# Patient Record
Sex: MALE | Race: WHITE | NOT HISPANIC OR LATINO | Employment: OTHER | URBAN - METROPOLITAN AREA
[De-identification: names, ages, dates, MRNs, and addresses within clinical notes are randomized per-mention and may not be internally consistent; named-entity substitution may affect disease eponyms.]

---

## 2022-01-26 ENCOUNTER — TELEPHONE (OUTPATIENT)
Dept: NEUROLOGY | Facility: CLINIC | Age: 77
End: 2022-01-26
Payer: MEDICARE

## 2022-02-16 ENCOUNTER — TELEPHONE (OUTPATIENT)
Dept: NEUROLOGY | Facility: CLINIC | Age: 77
End: 2022-02-16
Payer: MEDICARE

## 2022-02-16 NOTE — TELEPHONE ENCOUNTER
New patient would like to know if you know of any other doctor that does Iovera. The insurance options they have [facility] doesn't accept. They know of the physician in [place]. Please advise.

## 2022-02-18 ENCOUNTER — PATIENT MESSAGE (OUTPATIENT)
Dept: NEUROLOGY | Facility: CLINIC | Age: 77
End: 2022-02-18
Payer: MEDICARE

## 2022-07-15 ENCOUNTER — TELEPHONE (OUTPATIENT)
Dept: NEUROLOGY | Facility: CLINIC | Age: 77
End: 2022-07-15
Payer: COMMERCIAL

## 2022-07-15 NOTE — TELEPHONE ENCOUNTER
----- Message from Melonie Kenney sent at 7/15/2022  4:10 PM CDT -----  Regarding: Appt Access  Contact: [phone]  Request Appt Access    Who Called: Melania BEYER(Wife)  Appt Type: Consult for Nerve Block  Call Back Number:[phone]  Additional Information: The patient wife called to schedule the first available appointment.

## 2022-07-15 NOTE — TELEPHONE ENCOUNTER
Spoke with spouse of the patient and the following was discussed:    Patient previously had an appointment back in March but had to cancel due to insurance. Patient recently updated insurance to Medicare/[facility]. Patient wants to be schedule to have iovera performed but because patient has not been established yet, need to be scheduled as a new patient prior to any procedure. Informed spouse that the next available date won't be until October.    As for insurance, I will confirm with team to make sure insurance is acceptable prior to making an appointment.

## 2022-07-19 NOTE — TELEPHONE ENCOUNTER
Spoke with spouse of the patient, verified that insurance should be good and can be scheduled. Offered 7daychange slot on October 3 at 3pm.

## 2022-08-04 ENCOUNTER — PATIENT MESSAGE (OUTPATIENT)
Dept: NEUROLOGY | Facility: CLINIC | Age: 77
End: 2022-08-04
Payer: COMMERCIAL

## 2022-09-26 ENCOUNTER — TELEPHONE (OUTPATIENT)
Dept: NEUROLOGY | Facility: CLINIC | Age: 77
End: 2022-09-26
Payer: COMMERCIAL

## 2022-09-26 NOTE — TELEPHONE ENCOUNTER
----- Message from [facility] sent at 9/26/2022  4:10 PM CDT -----  Type:  Needs Medical Advice    Who Called: Pt   Would the patient rather a call back or a response via [facility]? Callback   Best Call Back Number: [phone]  Additional Information: pt requesting callback from office to discuss appt that is missing from his schedule and epic

## 2022-09-27 NOTE — TELEPHONE ENCOUNTER
Spoke with spouse of the patient and informed that the appointment is made and scheduled on 10/03 at 3pm

## 2022-10-03 ENCOUNTER — HOSPITAL ENCOUNTER (OUTPATIENT)
Dept: RADIOLOGY | Facility: HOSPITAL | Age: 77
Discharge: HOME | End: 2022-10-03
Attending: PSYCHIATRY & NEUROLOGY
Payer: COMMERCIAL

## 2022-10-03 ENCOUNTER — OFFICE VISIT (OUTPATIENT)
Dept: NEUROLOGY | Facility: CLINIC | Age: 77
End: 2022-10-03
Payer: COMMERCIAL

## 2022-10-03 VITALS
HEIGHT: 67 IN | DIASTOLIC BLOOD PRESSURE: 71 MMHG | WEIGHT: 152.13 LBS | SYSTOLIC BLOOD PRESSURE: 121 MMHG | BODY MASS INDEX: 23.88 KG/M2 | HEART RATE: 83 BPM

## 2022-10-03 DIAGNOSIS — M50.30 DDD (DEGENERATIVE DISC DISEASE), CERVICAL: ICD-10-CM

## 2022-10-03 DIAGNOSIS — M54.81 OCCIPITAL NEURALGIA OF RIGHT SIDE: ICD-10-CM

## 2022-10-03 DIAGNOSIS — M54.81 OCCIPITAL NEURALGIA OF RIGHT SIDE: Primary | ICD-10-CM

## 2022-10-03 PROCEDURE — 1159F PR MEDICATION LIST DOCUMENTED IN MEDICAL RECORD: ICD-10-PCS | Mod: CPTII,S$GLB,, | Performed by: PSYCHIATRY & NEUROLOGY

## 2022-10-03 PROCEDURE — 72050 X-RAY EXAM NECK SPINE 4/5VWS: CPT | Mod: TC

## 2022-10-03 PROCEDURE — 1160F RVW MEDS BY RX/DR IN RCRD: CPT | Mod: CPTII,S$GLB,, | Performed by: PSYCHIATRY & NEUROLOGY

## 2022-10-03 PROCEDURE — 1101F PR PT FALLS ASSESS DOC 0-1 FALLS W/OUT INJ PAST YR: ICD-10-PCS | Mod: CPTII,S$GLB,, | Performed by: PSYCHIATRY & NEUROLOGY

## 2022-10-03 PROCEDURE — 3288F FALL RISK ASSESSMENT DOCD: CPT | Mod: CPTII,S$GLB,, | Performed by: PSYCHIATRY & NEUROLOGY

## 2022-10-03 PROCEDURE — 72050 XR CERVICAL SPINE COMPLETE 5 VIEW: ICD-10-PCS | Mod: 26,,, | Performed by: RADIOLOGY

## 2022-10-03 PROCEDURE — 3288F PR FALLS RISK ASSESSMENT DOCUMENTED: ICD-10-PCS | Mod: CPTII,S$GLB,, | Performed by: PSYCHIATRY & NEUROLOGY

## 2022-10-03 PROCEDURE — 99999 PR PBB SHADOW E&M-EST. PATIENT-LVL III: ICD-10-PCS | Mod: PBBFAC,,, | Performed by: PSYCHIATRY & NEUROLOGY

## 2022-10-03 PROCEDURE — 1159F MED LIST DOCD IN RCRD: CPT | Mod: CPTII,S$GLB,, | Performed by: PSYCHIATRY & NEUROLOGY

## 2022-10-03 PROCEDURE — 1101F PT FALLS ASSESS-DOCD LE1/YR: CPT | Mod: CPTII,S$GLB,, | Performed by: PSYCHIATRY & NEUROLOGY

## 2022-10-03 PROCEDURE — 3078F DIAST BP <80 MM HG: CPT | Mod: CPTII,S$GLB,, | Performed by: PSYCHIATRY & NEUROLOGY

## 2022-10-03 PROCEDURE — 99203 PR OFFICE/OUTPT VISIT, NEW, LEVL III, 30-44 MIN: ICD-10-PCS | Mod: S$GLB,,, | Performed by: PSYCHIATRY & NEUROLOGY

## 2022-10-03 PROCEDURE — 3074F PR MOST RECENT SYSTOLIC BLOOD PRESSURE < 130 MM HG: ICD-10-PCS | Mod: CPTII,S$GLB,, | Performed by: PSYCHIATRY & NEUROLOGY

## 2022-10-03 PROCEDURE — 99999 PR PBB SHADOW E&M-EST. PATIENT-LVL III: CPT | Mod: PBBFAC,,, | Performed by: PSYCHIATRY & NEUROLOGY

## 2022-10-03 PROCEDURE — 1160F PR REVIEW ALL MEDS BY PRESCRIBER/CLIN PHARMACIST DOCUMENTED: ICD-10-PCS | Mod: CPTII,S$GLB,, | Performed by: PSYCHIATRY & NEUROLOGY

## 2022-10-03 PROCEDURE — 3074F SYST BP LT 130 MM HG: CPT | Mod: CPTII,S$GLB,, | Performed by: PSYCHIATRY & NEUROLOGY

## 2022-10-03 PROCEDURE — 99203 OFFICE O/P NEW LOW 30 MIN: CPT | Mod: S$GLB,,, | Performed by: PSYCHIATRY & NEUROLOGY

## 2022-10-03 PROCEDURE — 1125F PR PAIN SEVERITY QUANTIFIED, PAIN PRESENT: ICD-10-PCS | Mod: CPTII,S$GLB,, | Performed by: PSYCHIATRY & NEUROLOGY

## 2022-10-03 PROCEDURE — 3078F PR MOST RECENT DIASTOLIC BLOOD PRESSURE < 80 MM HG: ICD-10-PCS | Mod: CPTII,S$GLB,, | Performed by: PSYCHIATRY & NEUROLOGY

## 2022-10-03 PROCEDURE — 1125F AMNT PAIN NOTED PAIN PRSNT: CPT | Mod: CPTII,S$GLB,, | Performed by: PSYCHIATRY & NEUROLOGY

## 2022-10-03 PROCEDURE — 72050 X-RAY EXAM NECK SPINE 4/5VWS: CPT | Mod: 26,,, | Performed by: RADIOLOGY

## 2022-10-03 RX ORDER — LISINOPRIL 5 MG/1
5 TABLET ORAL DAILY
COMMUNITY

## 2022-10-03 RX ORDER — ATORVASTATIN CALCIUM 10 MG/1
50 TABLET, FILM COATED ORAL DAILY
COMMUNITY

## 2022-10-03 RX ORDER — TRAZODONE HYDROCHLORIDE 50 MG/1
50 TABLET ORAL NIGHTLY
COMMUNITY

## 2022-10-03 RX ORDER — ALBUTEROL SULFATE 90 UG/1
2 AEROSOL, METERED RESPIRATORY (INHALATION) EVERY 6 HOURS PRN
COMMUNITY

## 2022-10-03 RX ORDER — TIOTROPIUM BROMIDE 18 UG/1
18 CAPSULE ORAL; RESPIRATORY (INHALATION) DAILY
COMMUNITY

## 2022-10-03 RX ORDER — GABAPENTIN 800 MG/1
800 TABLET ORAL 2 TIMES DAILY
COMMUNITY
End: 2022-12-12 | Stop reason: SDUPTHER

## 2022-10-03 NOTE — PROGRESS NOTES
Subjective:       Patient ID: Rosalio Lares is a 77 y.o. male.    Chief Complaint:  Consult      Consultation Requested by:   Aaareferral Self  No address on file    History of Present Illness  77-year-old male presents for evaluation of 1 year of headaches.  The patient notes that he is not normally a headache sufferer.  He notes that for about a year now he has been dealing with bad occipital headaches that began on the back of his head and move forward on his scalp.  He notes that he has seen multiple pain management doctors and is timing has had at least 3 nerve blocks that have at best given him 5-6 hours of benefit.  He notes that he has had some sort of guidance for his headache possibly x-ray or ultrasound guidance.  He has tried and failed gabapentin, hydrocodone, lisinopril, trazodone, naproxen, meloxicam, tramadol, Cymbalta.  He is accompanied by his wife today who provides corroborative information.  Looking back at his medical record, it appears that he does not have cervical spine x-rays with the last 6 months to a year nor does he have MRI imaging of his cervical spine within last 6 months to a year.  He notes that he can go to sleep but otherwise his headaches last the entirety of the time that he is awake at least 16 hours.  He notes his headaches are usually 7/10 but can go up to 8 or 9/10 intensity.  He notes they are stabbing or shooting in nature.  He notes he has headaches 30 days out of 30 for the last year.  He notes that he does not have a known history of a head injury or fall or motor vehicle accident in this time frame.    History reviewed. No pertinent past medical history.    History reviewed. No pertinent surgical history.    History reviewed. No pertinent family history.    Social History     Socioeconomic History    Marital status:    Tobacco Use    Smoking status: Former     Types: Cigarettes    Smokeless tobacco: Never       Review of Systems  Review of Systems    Musculoskeletal:  Positive for neck stiffness. Negative for neck pain.   Neurological:  Positive for headaches.     Objective:     Vitals:    10/03/22 1458   BP: 121/71   Pulse: 83      Physical Exam  HENT:      Head:        Comments: Ultrasound examination shows an enlarged and inflamed right greater occipital nerve in between to occipital arteries.  Eyes:      Pupils: Pupils are equal, round, and reactive to light.   Neurological:      Mental Status: He is oriented to person, place, and time.      Motor: Motor strength is normal.      Deep Tendon Reflexes:      Reflex Scores:       Tricep reflexes are 1+ on the right side and 1+ on the left side.       Bicep reflexes are 1+ on the right side and 1+ on the left side.       Brachioradialis reflexes are 1+ on the right side and 1+ on the left side.       Patellar reflexes are 1+ on the right side and 1+ on the left side.        NEUROLOGICAL EXAMINATION:     MENTAL STATUS   Oriented to person, place, and time.   Attention: normal. Concentration: normal.   Level of consciousness: alert    CRANIAL NERVES     CN II   Visual fields full to confrontation.     CN III, IV, VI   Pupils are equal, round, and reactive to light.    CN V   Facial sensation intact.     CN VII   Facial expression full, symmetric.     CN VIII   CN VIII normal.     MOTOR EXAM   Muscle bulk: normal  Overall muscle tone: normal    Strength   Strength 5/5 throughout.     REFLEXES     Reflexes   Right brachioradialis: 1+  Left brachioradialis: 1+  Right biceps: 1+  Left biceps: 1+  Right triceps: 1+  Left triceps: 1+  Right patellar: 1+  Left patellar: 1+  Assessment/Plan:     Problem List Items Addressed This Visit    None  Visit Diagnoses       Occipital neuralgia of right side    -  Primary    Relevant Orders    X-Ray Cervical Spine Complete 5 view    NERVE BLOCK    DDD (degenerative disc disease), cervical        Relevant Orders    X-Ray Cervical Spine Complete 5 view             77-year-old male  presents for evaluation of occipital neuralgia.  At this time we have had a discussion about the fact that the patient has primarily right-sided occipital neuralgia.  We have planned to initiate with occipital nerve blocks on the right side as the patient has tried multiple different medications as outlined above including anti-inflammatories, muscle relaxants opioid type medications, SSRIs/antidepressant meds and is still having headaches.  He has headaches 30 days out of 30 that can last 16-20 hours at a time without relief.  He has had 3 nerve blocks in the past jaw gave him about 5 hours of relief but at this time we have discussed that they have only targeted the right greater occipital nerve.  We have discussed targeting the right greater and lesser occipital nerve as this may be a more complete reduction of the overall network that could be contributing to his headaches.  I will also obtain x-rays of the cervical spine to make sure that he does not have a more proximal impingement of this neural networks that could be providing him with headaches.  We have discussed the procedure at length and discussed the anatomical variations and considerations at length on today's visit.  The patient verbalizes understanding and agreement with the treatment plan. Questions were sought and answered to his stated verbal satisfaction.        Chandu Azar MD    This note is dictated on M*Modal Fluency speech recognition program. There are word recognition mistakes that are occasionally missed on review.

## 2022-10-04 ENCOUNTER — PATIENT MESSAGE (OUTPATIENT)
Dept: NEUROLOGY | Facility: CLINIC | Age: 77
End: 2022-10-04
Payer: COMMERCIAL

## 2022-10-04 ENCOUNTER — TELEPHONE (OUTPATIENT)
Dept: NEUROLOGY | Facility: CLINIC | Age: 77
End: 2022-10-04
Payer: COMMERCIAL

## 2022-10-04 DIAGNOSIS — M50.30 DDD (DEGENERATIVE DISC DISEASE), CERVICAL: ICD-10-CM

## 2022-10-04 DIAGNOSIS — M53.2X1 ATLANTOAXIAL INSTABILITY: ICD-10-CM

## 2022-10-04 DIAGNOSIS — M54.81 OCCIPITAL NEURALGIA OF RIGHT SIDE: Primary | ICD-10-CM

## 2022-10-04 NOTE — TELEPHONE ENCOUNTER
C1-C2 abnormality seen on cervical spine x-rays.  Given the patient's chief complaint of occipital neuralgia at the right greater occipital nerve, I am concerned that he may have some atlantoaxial instability.  I will obtain CT scan of the cervical spine to investigate this possibility.  This will be done at the [facility] location for him in [place].

## 2022-10-04 NOTE — TELEPHONE ENCOUNTER
CT order faxed to Imaging center that provider has provided.    Attempted to call the spouse of the patient, LVM to return the call.

## 2022-10-14 ENCOUNTER — TELEPHONE (OUTPATIENT)
Dept: NEUROLOGY | Facility: CLINIC | Age: 77
End: 2022-10-14
Payer: COMMERCIAL

## 2022-10-14 NOTE — TELEPHONE ENCOUNTER
I will discuss the following with  the patient:      I have left a detailed voicemail. He should still come back for occipital nerve block once it's approved.

## 2022-10-28 ENCOUNTER — PATIENT MESSAGE (OUTPATIENT)
Dept: NEUROLOGY | Facility: CLINIC | Age: 77
End: 2022-10-28
Payer: COMMERCIAL

## 2022-11-01 ENCOUNTER — PATIENT MESSAGE (OUTPATIENT)
Dept: NEUROLOGY | Facility: CLINIC | Age: 77
End: 2022-11-01
Payer: COMMERCIAL

## 2022-11-03 ENCOUNTER — PATIENT MESSAGE (OUTPATIENT)
Dept: NEUROLOGY | Facility: CLINIC | Age: 77
End: 2022-11-03
Payer: COMMERCIAL

## 2022-11-03 ENCOUNTER — TELEPHONE (OUTPATIENT)
Dept: NEUROLOGY | Facility: CLINIC | Age: 77
End: 2022-11-03
Payer: COMMERCIAL

## 2022-11-03 NOTE — TELEPHONE ENCOUNTER
----- Message from Sasha Amezquita sent at 11/3/2022 10:25 AM CDT -----  Regarding: auth for visit  Contact: [phone] Lares wife Melania calling regarding Patient Advice (message) about his appt authorization for upcoming appt.  call back [phone]

## 2022-11-14 ENCOUNTER — PATIENT MESSAGE (OUTPATIENT)
Dept: NEUROLOGY | Facility: CLINIC | Age: 77
End: 2022-11-14
Payer: COMMERCIAL

## 2022-12-02 ENCOUNTER — PATIENT MESSAGE (OUTPATIENT)
Dept: NEUROLOGY | Facility: CLINIC | Age: 77
End: 2022-12-02
Payer: COMMERCIAL

## 2022-12-12 ENCOUNTER — OFFICE VISIT (OUTPATIENT)
Dept: NEUROLOGY | Facility: CLINIC | Age: 77
End: 2022-12-12
Payer: COMMERCIAL

## 2022-12-12 VITALS
BODY MASS INDEX: 24.01 KG/M2 | DIASTOLIC BLOOD PRESSURE: 70 MMHG | WEIGHT: 153 LBS | SYSTOLIC BLOOD PRESSURE: 117 MMHG | HEIGHT: 67 IN | HEART RATE: 102 BPM

## 2022-12-12 DIAGNOSIS — M54.81 OCCIPITAL NEURALGIA OF RIGHT SIDE: Primary | ICD-10-CM

## 2022-12-12 PROCEDURE — 99499 NO LOS: ICD-10-PCS | Mod: S$GLB,,, | Performed by: PSYCHIATRY & NEUROLOGY

## 2022-12-12 PROCEDURE — 3074F SYST BP LT 130 MM HG: CPT | Mod: CPTII,S$GLB,, | Performed by: PSYCHIATRY & NEUROLOGY

## 2022-12-12 PROCEDURE — 99499 UNLISTED E&M SERVICE: CPT | Mod: S$GLB,,, | Performed by: PSYCHIATRY & NEUROLOGY

## 2022-12-12 PROCEDURE — 99999 PR PBB SHADOW E&M-EST. PATIENT-LVL III: CPT | Mod: PBBFAC,,, | Performed by: PSYCHIATRY & NEUROLOGY

## 2022-12-12 PROCEDURE — 1159F PR MEDICATION LIST DOCUMENTED IN MEDICAL RECORD: ICD-10-PCS | Mod: CPTII,S$GLB,, | Performed by: PSYCHIATRY & NEUROLOGY

## 2022-12-12 PROCEDURE — 1159F MED LIST DOCD IN RCRD: CPT | Mod: CPTII,S$GLB,, | Performed by: PSYCHIATRY & NEUROLOGY

## 2022-12-12 PROCEDURE — 1101F PR PT FALLS ASSESS DOC 0-1 FALLS W/OUT INJ PAST YR: ICD-10-PCS | Mod: CPTII,S$GLB,, | Performed by: PSYCHIATRY & NEUROLOGY

## 2022-12-12 PROCEDURE — 64405 NJX AA&/STRD GR OCPL NRV: CPT | Mod: 51,RT,S$GLB, | Performed by: PSYCHIATRY & NEUROLOGY

## 2022-12-12 PROCEDURE — 76942 ECHO GUIDE FOR BIOPSY: CPT | Mod: S$GLB,,, | Performed by: PSYCHIATRY & NEUROLOGY

## 2022-12-12 PROCEDURE — 1125F PR PAIN SEVERITY QUANTIFIED, PAIN PRESENT: ICD-10-PCS | Mod: CPTII,S$GLB,, | Performed by: PSYCHIATRY & NEUROLOGY

## 2022-12-12 PROCEDURE — 1125F AMNT PAIN NOTED PAIN PRSNT: CPT | Mod: CPTII,S$GLB,, | Performed by: PSYCHIATRY & NEUROLOGY

## 2022-12-12 PROCEDURE — 76942 PR U/S GUIDANCE FOR NEEDLE GUIDANCE: ICD-10-PCS | Mod: S$GLB,,, | Performed by: PSYCHIATRY & NEUROLOGY

## 2022-12-12 PROCEDURE — 1160F RVW MEDS BY RX/DR IN RCRD: CPT | Mod: CPTII,S$GLB,, | Performed by: PSYCHIATRY & NEUROLOGY

## 2022-12-12 PROCEDURE — 1160F PR REVIEW ALL MEDS BY PRESCRIBER/CLIN PHARMACIST DOCUMENTED: ICD-10-PCS | Mod: CPTII,S$GLB,, | Performed by: PSYCHIATRY & NEUROLOGY

## 2022-12-12 PROCEDURE — 64450 PR NERVE BLOCK INJ, ANES/STEROID, OTHER PERIPHERAL: ICD-10-PCS | Mod: RT,S$GLB,, | Performed by: PSYCHIATRY & NEUROLOGY

## 2022-12-12 PROCEDURE — 99999 PR PBB SHADOW E&M-EST. PATIENT-LVL III: ICD-10-PCS | Mod: PBBFAC,,, | Performed by: PSYCHIATRY & NEUROLOGY

## 2022-12-12 PROCEDURE — 3078F DIAST BP <80 MM HG: CPT | Mod: CPTII,S$GLB,, | Performed by: PSYCHIATRY & NEUROLOGY

## 2022-12-12 PROCEDURE — 1101F PT FALLS ASSESS-DOCD LE1/YR: CPT | Mod: CPTII,S$GLB,, | Performed by: PSYCHIATRY & NEUROLOGY

## 2022-12-12 PROCEDURE — 3078F PR MOST RECENT DIASTOLIC BLOOD PRESSURE < 80 MM HG: ICD-10-PCS | Mod: CPTII,S$GLB,, | Performed by: PSYCHIATRY & NEUROLOGY

## 2022-12-12 PROCEDURE — 3288F FALL RISK ASSESSMENT DOCD: CPT | Mod: CPTII,S$GLB,, | Performed by: PSYCHIATRY & NEUROLOGY

## 2022-12-12 PROCEDURE — 3074F PR MOST RECENT SYSTOLIC BLOOD PRESSURE < 130 MM HG: ICD-10-PCS | Mod: CPTII,S$GLB,, | Performed by: PSYCHIATRY & NEUROLOGY

## 2022-12-12 PROCEDURE — 64450 NJX AA&/STRD OTHER PN/BRANCH: CPT | Mod: RT,S$GLB,, | Performed by: PSYCHIATRY & NEUROLOGY

## 2022-12-12 PROCEDURE — 3288F PR FALLS RISK ASSESSMENT DOCUMENTED: ICD-10-PCS | Mod: CPTII,S$GLB,, | Performed by: PSYCHIATRY & NEUROLOGY

## 2022-12-12 PROCEDURE — 64405 PR NERVE BLOCK INJ, ANES/STEROID, OCCIPITAL: ICD-10-PCS | Mod: 51,RT,S$GLB, | Performed by: PSYCHIATRY & NEUROLOGY

## 2022-12-12 RX ORDER — GABAPENTIN 800 MG/1
TABLET ORAL
Qty: 270 TABLET | Refills: 3 | Status: SHIPPED | OUTPATIENT
Start: 2022-12-12

## 2022-12-12 NOTE — PROGRESS NOTES
"  Right greater and lesser occipital nerve block    Date/Time: 12/12/2022 3:00 PM  Performed by: Alexander Azar III, MD  Authorized by: Alexander Azar III, MD   Consent Done: Yes  Time out: Immediately prior to procedure a "time out" was called to verify the correct patient, procedure, equipment, support staff and site/side marked as required.  Indications: pain relief  Body area: head  Laterality: right (right greater and lesser occipital nerve)    Patient sedated: no  Medications administered: DepoMedrol 40 mg injection  Patient position: prone  Needle size: 27 G  Location technique: ultrasound guidance and anatomical landmarks (ultrasound images saved on secure external hard drive)  Local Anesthetic: lidocaine 2% without epinephrine and bupivacaine 0.5% without epinephrine  Anesthetic total: 6 mL  Outcome: pain improved  Patient tolerance: Patient tolerated the procedure well with no immediate complications  Comments: Patient notes his worst headache last 24 hours 8/10, at the time of procedure 5/10, immediately after procedure 0/10.  I have reviewed the patient's x-rays of his cervical spine and the results of the CT scan of the cervical spine.  I have interpreted them for him and his wife.  Because the patient has retrolisthesis of C3 on C4 and C4-C5, if this procedure does not give him significant benefit, consider repeat visit with his pain management physician [place] versus referral to Neurosurgery or spine specialist in [place].      "

## 2022-12-19 ENCOUNTER — PATIENT MESSAGE (OUTPATIENT)
Dept: NEUROLOGY | Facility: CLINIC | Age: 77
End: 2022-12-19
Payer: COMMERCIAL

## 2022-12-19 DIAGNOSIS — M54.81 OCCIPITAL NEURALGIA OF RIGHT SIDE: Primary | ICD-10-CM

## 2022-12-20 ENCOUNTER — PATIENT MESSAGE (OUTPATIENT)
Dept: NEUROLOGY | Facility: CLINIC | Age: 77
End: 2022-12-20
Payer: COMMERCIAL

## 2022-12-20 NOTE — TELEPHONE ENCOUNTER
This is a medically necessary procedure as the patient has Occipital Neuralgia which I have previously documented. I am treating them with the cryoneurolysis specifically for Occipital Neuralgia. This is a safe and effective treatment for headaches that has been around since the 1970s and has been modernized to use a smaller device that has FDA approval.    References:     Alina BAEZA., Steffen REDMAN., Cailin ZHANG., Doe ALMAZAN., Shannon KNOTT. (2019) Cryotherapy. In: Chrissy GOMEZ., Pope THA., Lon GOMEZ., Power CORTEZ. (eds) Mountain's Treatment of Pain. Leyva, Silvia    Ishaan RIVERA, Edie RIVERA, iDlcia CORTEZ. New Technique for Cryoneuroablation of the Proximal Greater Occipital Nerve. Pain Pract. 2019 Jul;19(6):594-601. doi:10.1111/papr.13944. Epub 2019 Mar 22. PubMed PMID: 80973487.    Tony SOLARES, Pearl JEFFRIES. Occipital Neuralgia. 2019 Jun 4. [employer]BrandAnalytics [Internet]. [place]): [employer]; 2019 Jan-. Available from http://www.ncbi.nlm.nih.gov/books/BIO890581/ PubMed PMID: 67945095.    Kandace RIVERA, Ronal A, Jesica C, Hernán B, Tamra E, Kandace PECK, Suzy RIVERA. Greater occipital nerve cryoneurolysis in the management of intractable occipital neuralgia. J Neuroradiol. 2018 Oct;45(6):386-390.    Parrish I, Crystal SR. Neuralgias of the Head: Occipital Neuralgia. J. Italian Med. Sci. 2016 Apr;31(4):479-88.    Shannon CH, Naun W, Darien J, Hadley K, Braeden T, Jordy UGALDE. Cryoablation for the treatment of occipital neuralgia. Pain Physician. 2015 May-Jun;18(3):E363-8. PubMed PMID: 13533019.    Germania CARLOS, David W. Occipital Neuralgia and Cervicogenic Headache: Diagnosis and Management. Curr Neurol Neurosci Rep. 2019 Mar 19;19(5):20. doi:10.1007/i69051-352-9418-2. Review. PubMed PMID: 93260082.    Sujit S, Kira J, Brianna RJ, Jose M, Tia RS. A Case Report of an Enlarged Suboccipital Nerve with Cutaneous Branch. Cureus. 2018 Jul 06;10(7):e2933.    Arun-Claudia E, Sophia J, Alex-Stuart D, Rayshawn A, Shelly ÁLG. Clinical Characteristics and  Therapeutic Results in a Series of 68 Patients with Occipital Neuralgia. Pain Med. 2019 Sep 9. pii: [id number]. doi: 10.1093/pm/smz733. [Epub ahead of print] PubMed PMID: 43579283.    Rodo LM, Jasbir-Roni A, Lincoln BACA, Frank H. Treatment of Occipital Neuralgia by Thermal Radiofrequency Ablation. [facility]. 2018 Fall;18(3):209-214. doi: 10.50558/cholo.17.0104. PubMed PMID: 61840106; PubMed Central PMCID:  [id number].    Natalia WM, Duc EC. For cluster headaches: cryosurgery. Va Med. 1981Sep;108(9):622-4.     Elvin ALMAZAN. Chilling away the pain of vascular headache. FRANK. 1979 Aug 10;242(6):504.     Emanuel HAUSER. Cryosurgery of headache. Res Clin Stud Headache. 1978;5:.    Emanuel HAUSER. Cryosurgery of migraine. Headache. 1973 Jan;12(4):143-50.     Sepsas E, Sarahs P, Anagnostopdaniellau M, German O, Voterri G, Kamarty S. The role of intercostal cryoanalgesia in post-thoracotomy analgesia. Interact Cardiovasc Thorac Surg. 2013 Jun;16(6):814-8. doi: 10.1093/icvts/bwn506. Epub 2013 Feb 19.    Fermín NA, Lynn JH, Jacob AR. Ultrasound-guided cryoablation of genitofemoral nerve for chronic inguinal pain. Pain Physician. 2009 Nov-Dec;12(6):997-1000.     Rianna J, Bob S, Dereck T, Otilia N, Shonda V, Diegeler A, Autschbach R, Catherine FW. Pain is significantly reduced by cryoablation therapy in patients with lateral minithoracotomy. Marita Thorac Surg. 2000 Sep;70(3):1100-4.     Chantal JF, Kelli TA. Response of cervicogenic headaches and occipital neuralgia to radiofrequency ablation of the C2 dorsal root ganglion and/or third occipital nerve. Headache. 2014 Mar;54(3):500-10. doi: 10.1111/head.26628.     ROSE Alfredo., Cryoanalgesia. The application of low temperatures to nerves to produce anaesthesia or analgesia. Anaesthesia, 1981. 36(11): p. 1003-13.    DANELLE Davis, Significance of clinical treatments on peripheral nerve and its effect on nerve regeneration. J Neurol Disord, 2014. 2(4): p. 68.    SUNNY Irizarry, Cryoanalgesia in  interventional pain management. Pain Physician, 2003. 6(3): p. 345-60.    SHAYY Saunders, Cryoanalgesia for post-herpetic neuralgia: a new treatment. Int J Dermatol, 2011. 50(6): p. 746-50.    SHAYY Paulson, STAN Jolley, and KYE Cary, Cryogenic denervation of the intermetatarsal space neuroma. The Journal of Foot and Ankle Surgery, 1997. 36(4): p. 311-314.    BLADIMIR Hall, IRIS aRy, and QUINN Figueredo, Sonographically guided cryoneurolysis: preliminary experience and clinical outcomes. J Ultrasound Med, 2012. 31(12): p. 2025-34.    IRIS Contreras, MARS Johnston, and MARS Alfredo, Cryoanalgesia in the management of chronic facial pain. J Maxillofac Surg, 1981. 9(2): p. 101-2.    DARIAN Rincon, A 22-year study of paroxysmal trigeminal neuralgia in 211 patients with a 3-year appraisal of the role of cryotherapy. Oral Surg Oral Med Oral Pathol, 1984. 58(1): p. 17-23.    MANDA Schneider, Cryotherapy in the management of paroxysmal trigeminal neuralgia. J Neurol Neurosurg Psychiatry, 1987. 50(4): p. 485-7.    MANDA Schneider, Cryotherapy for trigeminal neuralgia: a 10 year audit. Br J Oral Maxillofac Surg, 1991. 29(1): p. 1-4.    MANDA Schneider and DARIAN Rincon, The role of cryotherapy (cryoanalgesia) in the management of paroxysmal trigeminal neuralgia: a six year experience. Br J Oral Maxillofac Surg, 1988. 26(1): p. 18-25.    BENJAMÍN Rapp, CHICHO De La Cruz, and OLINDA Lay, Cryoanalgesia in the management of intractable pain in the temporomandibular joint: a five-year retrospective review. Br J Oral Maxillofac Surg, 2011. 49(8): p. 653-6.    JUDITH Romo, et al., CT guided percutaneous cryoneurolysis for post thoracotomy pain syndrome: early experience and effectiveness. Acad Radiol, 2010. 17(5): p. 603-6.    MARYSE Márquez. and SERGIO Terry, Ultrasound-guided intercostal nerve cryoablation. Anesth Analg, 2006. 103(4): p. 1033-5.    ROSE Alfredo., SAVANAH Johnston, and KIN Hemphill, Cryoanalgesia for intractable perineal pain. J R Soc Med, 1981.  74(11): p. 804-9.    BENNY Estrella. and DICK Chery, The history of cryosurgery. J R Soc Med, 2001. 94(4): p. 196-201.    DALJIT Ng., History of cryosurgery. Semin Surg Oncol, 1998. 14(2): p. .    THOMAS Love., A classification of peripheral nerve injuries producing loss of function. Brain, 1951. 74(4): p. 491-516.    PATRICIA Love, Nerves and nerve injuries. 1968, [place] & [place]: [facility].    SHAYY White, et al., Mechanism research of cryoanalgesia. Neurol Res, 1995. 17(4): p. 307.    JET Montanez and MITALI Tillman, Pathophysiology of peripheral nerve injury: a brief review. Neurosurgical focus, 2004. 16(5): p. 1-7.    SMILEY Arteaga, et al., Effects of cryoanalgesia on post-thoracotomy pain and on the structure of intercostal nerves: a human prospective randomized trial and a histological study. Eur J Cardiothorac Surg, 2001. 20(3): p. 502-7.  ROSE Alfredo., SAVANAH Johnston, and WANDA Oakes, Cryoanalgesia: the response to alterations in freeze cycle and temperature. Br J Anaesth, 1981. 53(11): p. 1121-7.    JUDITH Villalta and SHANTA Hernandez, Neurofilament elongation into regenerating facial nerve axons. Neuroscience, 1989. 29(3): p. 659-66.    SHAYY Vasquez, Neuroscience: Fundamentals for Rehabilitation; 3rd ed. 2007, [employer]: [place], [facility].    ZULMA Combs., Judith N, Current concepts of neurolysis and clinical applications. Journal of the Analgesics, 2014. 2: p. 16-22.    DANELLE Davis and DARIAN Leon, Wallerian degeneration and recovery of motor nerves after multiple focused cold therapies. Muscle & Nerve, 2015. 51(2): p. 268-275.    Ilia DUGAN, SELENE., Nataliia XNISSA, et al., Cryotherapy in treatment of low back pain. Chin J Surg (Chinese), 1991. 29(12): p. 721729

## 2023-01-17 ENCOUNTER — OFFICE VISIT (OUTPATIENT)
Dept: NEUROLOGY | Facility: CLINIC | Age: 78
End: 2023-01-17
Payer: COMMERCIAL

## 2023-01-17 DIAGNOSIS — G44.86 CERVICOGENIC HEADACHE: ICD-10-CM

## 2023-01-17 DIAGNOSIS — M54.81 OCCIPITAL NEURALGIA OF RIGHT SIDE: Primary | ICD-10-CM

## 2023-01-17 PROCEDURE — 1160F RVW MEDS BY RX/DR IN RCRD: CPT | Mod: CPTII,95,, | Performed by: PSYCHIATRY & NEUROLOGY

## 2023-01-17 PROCEDURE — 1159F PR MEDICATION LIST DOCUMENTED IN MEDICAL RECORD: ICD-10-PCS | Mod: CPTII,95,, | Performed by: PSYCHIATRY & NEUROLOGY

## 2023-01-17 PROCEDURE — 99499 NO LOS: ICD-10-PCS | Mod: 95,,, | Performed by: PSYCHIATRY & NEUROLOGY

## 2023-01-17 PROCEDURE — 99499 UNLISTED E&M SERVICE: CPT | Mod: 95,,, | Performed by: PSYCHIATRY & NEUROLOGY

## 2023-01-17 PROCEDURE — 1160F PR REVIEW ALL MEDS BY PRESCRIBER/CLIN PHARMACIST DOCUMENTED: ICD-10-PCS | Mod: CPTII,95,, | Performed by: PSYCHIATRY & NEUROLOGY

## 2023-01-17 PROCEDURE — 1159F MED LIST DOCD IN RCRD: CPT | Mod: CPTII,95,, | Performed by: PSYCHIATRY & NEUROLOGY

## 2023-01-17 NOTE — PROGRESS NOTES
Established Patient - Audio Only Telehealth Visit     The patient location is: Home  The chief complaint leading to consultation is: Headache  Visit type: Virtual visit with audio only (telephone)  Total time spent with patient: 15 minutes       The reason for the audio only service rather than synchronous audio and video virtual visit was related to technical difficulties or patient preference/necessity.     Each patient to whom I provide medical services by telemedicine is:  (1) informed of the relationship between the physician and patient and the respective role of any other health care provider with respect to management of the patient; and (2) notified that they may decline to receive medical services by telemedicine and may withdraw from such care at any time. Patient verbally consented to receive this service via voice-only telephone call.       HPI: s/p nerve block on 12/12/22 for right sided greater and lesser occipital nerve with ultrasound     Assessment and plan:     77-year-old male status post right-sided occipital nerve block under ultrasound guidance of the right greater and lesser occipital nerve.  We had a significant reduction of his headache reducing his headache from a 9/10 to a 0/10 for several days.  He had a significant but short-term relief.  We have previously discussed that he has right-sided occipital neuralgia and he would be amenable to cryoneurolysis of the right greater and lesser occipital nerve.  I had previously placed a request for cryoneurolysis of these nerves with his insurance but the patient nor I have heard back regarding this prior authorization request.  I will have the patient and his wife try to follow-up with his insurance and see where we are with this request.  He does have an appointment for later this month for this procedure.  If I need to replace an order I can do it or if we need to start the appeal if it is not then we can start that process.  If it is approved  then we can keep the appointment as it stands.       This service was not originating from a related E/M service provided within the previous 7 days nor will  to an E/M service or procedure within the next 24 hours or my soonest available appointment.  Prevailing standard of care was able to be met in this audio-only visit.

## 2023-01-25 ENCOUNTER — TELEPHONE (OUTPATIENT)
Dept: NEUROLOGY | Facility: CLINIC | Age: 78
End: 2023-01-25
Payer: COMMERCIAL

## 2023-01-26 NOTE — TELEPHONE ENCOUNTER
----- Message from Iglesia Lozoya sent at 1/25/2023  2:55 PM CST -----      Name of Who is Calling: BRYANNA BEYER [[id number]]      What is the request in detail: Pt wife would like a call back from the office.Please contact to further discuss and advise.          Can the clinic reply by [facility]: N      What Number to [facility] Back [facility]: [phone]

## 2023-01-30 ENCOUNTER — PROCEDURE VISIT (OUTPATIENT)
Dept: NEUROLOGY | Facility: CLINIC | Age: 78
End: 2023-01-30
Payer: COMMERCIAL

## 2023-01-30 VITALS
BODY MASS INDEX: 24.22 KG/M2 | HEIGHT: 67 IN | DIASTOLIC BLOOD PRESSURE: 77 MMHG | HEART RATE: 106 BPM | SYSTOLIC BLOOD PRESSURE: 134 MMHG | WEIGHT: 154.31 LBS

## 2023-01-30 DIAGNOSIS — M54.81 OCCIPITAL NEURALGIA OF RIGHT SIDE: ICD-10-CM

## 2023-01-30 PROCEDURE — 76942 PR U/S GUIDANCE FOR NEEDLE GUIDANCE: ICD-10-PCS | Mod: S$GLB,,, | Performed by: PSYCHIATRY & NEUROLOGY

## 2023-01-30 PROCEDURE — 76942 ECHO GUIDE FOR BIOPSY: CPT | Mod: S$GLB,,, | Performed by: PSYCHIATRY & NEUROLOGY

## 2023-01-30 PROCEDURE — 64405 PR NERVE BLOCK INJ, ANES/STEROID, OCCIPITAL: ICD-10-PCS | Mod: 59,50,S$GLB, | Performed by: PSYCHIATRY & NEUROLOGY

## 2023-01-30 PROCEDURE — 64405 NJX AA&/STRD GR OCPL NRV: CPT | Mod: 59,50,S$GLB, | Performed by: PSYCHIATRY & NEUROLOGY

## 2023-01-30 PROCEDURE — 64640 INJECTION TREATMENT OF NERVE: CPT | Mod: 50,S$GLB,, | Performed by: PSYCHIATRY & NEUROLOGY

## 2023-01-30 PROCEDURE — 64450 PR NERVE BLOCK INJ, ANES/STEROID, OTHER PERIPHERAL: ICD-10-PCS | Mod: 50,59,S$GLB, | Performed by: PSYCHIATRY & NEUROLOGY

## 2023-01-30 PROCEDURE — 64640 PR DESTRUCT BY NEURO AGENT; OTHER PERIPH NERVE: ICD-10-PCS | Mod: 50,S$GLB,, | Performed by: PSYCHIATRY & NEUROLOGY

## 2023-01-30 PROCEDURE — 64450 NJX AA&/STRD OTHER PN/BRANCH: CPT | Mod: 50,59,S$GLB, | Performed by: PSYCHIATRY & NEUROLOGY

## 2023-01-30 NOTE — PROCEDURES
Iovera    Date/Time: 1/30/2023 8:40 AM  Performed by: Alexander Azar III, MD  Authorized by: Alexander Azar III, MD   Local anesthesia used: yes  Anesthesia: nerve block    Anesthesia:  Local anesthesia used: yes  Local Anesthetic: lidocaine 1% without epinephrine and bupivacaine 0.5% without epinephrine  Anesthetic total: 2 mL    Sedation:  Patient sedated: no    Patient tolerance: patient tolerated the procedure well with no immediate complications    Informed consent was obtained and will be scanned into the electronic medical record.     The patient was placed into a prone position with the neck flexed forward.  Nerve block was accomplished first with ultrasound guidance and anatomic landmarks using lidocaine 1% without epinephrine injected through a 27-gauge needle with topical spray and anesthetic and cleaning solution applied before injection at bilateral greater and lesser occipital nerves.  Afterwards cryoneurolysis was performed using ultrasound guidance and anatomic landmarks after nerve block at bilateral greater and lesser occipital nerves.  Ultrasound images were saved on secure external hard drive.    There were 2 nerves at 4 sites treated using a total of 8 treatment cycles.

## 2023-03-13 ENCOUNTER — OFFICE VISIT (OUTPATIENT)
Dept: NEUROLOGY | Facility: CLINIC | Age: 78
End: 2023-03-13
Payer: COMMERCIAL

## 2023-03-13 DIAGNOSIS — M54.81 OCCIPITAL NEURALGIA OF RIGHT SIDE: Primary | ICD-10-CM

## 2023-03-13 PROCEDURE — 99499 UNLISTED E&M SERVICE: CPT | Mod: 95,,, | Performed by: PSYCHIATRY & NEUROLOGY

## 2023-03-13 PROCEDURE — 99499 NO LOS: ICD-10-PCS | Mod: 95,,, | Performed by: PSYCHIATRY & NEUROLOGY

## 2023-03-13 NOTE — PROGRESS NOTES
Established Patient - Audio Only Telehealth Visit     The patient location is:  Home  The chief complaint leading to consultation is:  Neck pain and headache  Visit type: Virtual visit with audio only (telephone)  Total time spent with patient: 20       The reason for the audio only service rather than synchronous audio and video virtual visit was related to technical difficulties or patient preference/necessity.     Each patient to whom I provide medical services by telemedicine is:  (1) informed of the relationship between the physician and patient and the respective role of any other health care provider with respect to management of the patient; and (2) notified that they may decline to receive medical services by telemedicine and may withdraw from such care at any time. Patient verbally consented to receive this service via voice-only telephone call.       HPI:  77-year-old male seen in follow-up after cryoneurolysis done in late January.  He notes that he had 3 weeks of benefit and then his headaches started coming back.  He notes that his headaches have returned are different and shaped in the used to be.  He notes the headaches no longer go to the front of his face and only go up the back of his skull and somewhat across the mastoid.  I have discussed that the patient has prior known C3-C4 pathology at [facility] and he may need to go back and see his pain management physician locally.     Assessment and plan:    Problem List Items Addressed This Visit          Orthopedic    Occipital neuralgia of right side - Primary    Overview     1/30 for bilateral iovera    Notes benefit for 3 weeks    Pain at neck and base of skull, but no pain forward to the front of the face    Recommend return to pain management to address known C3-C4, C4-C5          We have discussed that I will see him back in about 6 months for another virtual audio visit at that time if we need to discuss either repeating nerve blocks or  other treatment options we can do that.  But for now I recommend he have a sit-down conversation with his pain management physician for the next step in his treatment.       This service was not originating from a related E/M service provided within the previous 7 days nor will  to an E/M service or procedure within the next 24 hours or my soonest available appointment.  Prevailing standard of care was able to be met in this audio-only visit.

## 2023-05-22 ENCOUNTER — TELEPHONE (OUTPATIENT)
Dept: NEUROLOGY | Facility: CLINIC | Age: 78
End: 2023-05-22
Payer: COMMERCIAL

## 2023-05-22 NOTE — TELEPHONE ENCOUNTER
----- Message from Rosibel Rosas sent at 5/22/2023 12:24 PM CDT -----  Regarding: pt advice  Contact: pt [phone]  Melania/spouse calling to speak to someone about visit from 12/22/2022. Pls call

## 2023-05-23 ENCOUNTER — TELEPHONE (OUTPATIENT)
Dept: NEUROLOGY | Facility: CLINIC | Age: 78
End: 2023-05-23
Payer: COMMERCIAL

## 2023-05-23 NOTE — TELEPHONE ENCOUNTER
----- Message from Zuri Shetty LPN sent at 5/23/2023  1:01 PM CDT -----  Regarding: FW: pt advice  Contact: sonia Velázquez @[phone]  This  was sent to wrong dept.  ----- Message -----  From: Helga GOMEZ May  Sent: 5/22/2023   3:24 PM CDT  To: [facility] Staff  Subject: pt advice                                        Caller returning call from Steve in regards to pt. Pls call to discuss.

## 2023-05-23 NOTE — TELEPHONE ENCOUNTER
Spoke with spouse and discussed that an insurance letter was received by mail stating that procedure back in December was not approved and owes around $800. Patient tried calling pre-service but no help/resolution was resolved. Patient is currently trying to do an appeal. Spouse wants to know if MD can write a formal letter stating reason/documentation why the patient received iovera procedure here in state of [place] vs obtaining full coverage in [place].    Sent message to pre-service to help look into this.